# Patient Record
Sex: MALE | Race: WHITE | Employment: FULL TIME | URBAN - METROPOLITAN AREA
[De-identification: names, ages, dates, MRNs, and addresses within clinical notes are randomized per-mention and may not be internally consistent; named-entity substitution may affect disease eponyms.]

---

## 2018-06-05 PROBLEM — J30.2 CHRONIC SEASONAL ALLERGIC RHINITIS: Status: ACTIVE | Noted: 2018-06-05

## 2021-08-09 ENCOUNTER — TELEPHONE (OUTPATIENT)
Dept: FAMILY MEDICINE CLINIC | Age: 31
End: 2021-08-09

## 2021-08-09 NOTE — TELEPHONE ENCOUNTER
----- Message from Louvenia Mcburney, MA sent at 2021 10:27 AM EDT -----  Subject: Appointment Request    Reason for Call: New Patient Request Appointment    QUESTIONS  Type of Appointment? New Patient/New to Provider  Reason for appointment request? No appointments available during search  Additional Information for Provider? Wife has appt in Oct to Est care.  would like appt also. Please call wife to schedule.    [phone]. Needs Thursday appt. early as possible.   ---------------------------------------------------------------------------  --------------  CALL BACK INFO  What is the best way for the office to contact you? OK to leave message on   voicemail  Preferred Call Back Phone Number? [phone]  ---------------------------------------------------------------------------  --------------  SCRIPT ANSWERS  Relationship to Patient? Third Party  Representative Name? Ivy Wiggins  Specialty Confirmation? Primary Care  Is this the first appointment to establish care for a ? No  Have you been diagnosed with, awaiting test results for, or told that you   are suspected of having COVID-19 (Coronavirus)? (If patient has tested   negative or was tested as a requirement for work, school, or travel and   not based on symptoms, answer no)? No  Do you currently have flu-like symptoms including fever or chills, cough,   shortness of breath, difficulty breathing, or new loss of taste or smell? No  Have you had close contact with someone with COVID-19 in the last 14 days? No  (Service Expert - click yes below to proceed with enavu As Usual   Scheduling)?  Yes

## 2021-10-14 ENCOUNTER — OFFICE VISIT (OUTPATIENT)
Dept: FAMILY MEDICINE CLINIC | Age: 31
End: 2021-10-14
Payer: COMMERCIAL

## 2021-10-14 VITALS
OXYGEN SATURATION: 97 % | RESPIRATION RATE: 16 BRPM | BODY MASS INDEX: 31.78 KG/M2 | HEART RATE: 85 BPM | HEIGHT: 71 IN | TEMPERATURE: 97.6 F | SYSTOLIC BLOOD PRESSURE: 126 MMHG | WEIGHT: 227 LBS | DIASTOLIC BLOOD PRESSURE: 84 MMHG

## 2021-10-14 DIAGNOSIS — R53.83 FATIGUE, UNSPECIFIED TYPE: ICD-10-CM

## 2021-10-14 DIAGNOSIS — Z76.89 ENCOUNTER TO ESTABLISH CARE: Primary | ICD-10-CM

## 2021-10-14 DIAGNOSIS — Z83.3 FAMILY HISTORY OF DIABETES MELLITUS: ICD-10-CM

## 2021-10-14 DIAGNOSIS — Z13.220 SCREENING FOR HYPERLIPIDEMIA: ICD-10-CM

## 2021-10-14 DIAGNOSIS — Z80.8 FAMILY HISTORY OF MELANOMA: ICD-10-CM

## 2021-10-14 DIAGNOSIS — Z30.09 VASECTOMY EVALUATION: ICD-10-CM

## 2021-10-14 DIAGNOSIS — D22.9 MULTIPLE ATYPICAL SKIN MOLES: ICD-10-CM

## 2021-10-14 PROCEDURE — 99203 OFFICE O/P NEW LOW 30 MIN: CPT | Performed by: FAMILY MEDICINE

## 2021-10-14 RX ORDER — FAMOTIDINE 20 MG/1
20 TABLET, FILM COATED ORAL 2 TIMES DAILY
COMMUNITY

## 2021-10-14 RX ORDER — M-VIT,TX,IRON,MINS/CALC/FOLIC 27MG-0.4MG
1 TABLET ORAL DAILY
COMMUNITY

## 2021-10-14 ASSESSMENT — ENCOUNTER SYMPTOMS
COUGH: 0
SHORTNESS OF BREATH: 0
SORE THROAT: 0
CONSTIPATION: 0
ABDOMINAL PAIN: 0
RHINORRHEA: 0
VOMITING: 0
SINUS PAIN: 0
DIARRHEA: 0
NAUSEA: 0
BACK PAIN: 0

## 2021-10-14 ASSESSMENT — PATIENT HEALTH QUESTIONNAIRE - PHQ9
SUM OF ALL RESPONSES TO PHQ9 QUESTIONS 1 & 2: 0
2. FEELING DOWN, DEPRESSED OR HOPELESS: 0
SUM OF ALL RESPONSES TO PHQ QUESTIONS 1-9: 0
1. LITTLE INTEREST OR PLEASURE IN DOING THINGS: 0
SUM OF ALL RESPONSES TO PHQ QUESTIONS 1-9: 0
SUM OF ALL RESPONSES TO PHQ QUESTIONS 1-9: 0

## 2021-10-14 NOTE — PROGRESS NOTES
10/14/2021    Chief Complaint   Patient presents with    New Patient     previous pcp Dr Kevin Arora ([facility]) last seen 3 years ago. No specialists. Here to establish care but is concerned with family history of melenoma (father) patient wants some moles checked out.  Health Maintenance     declines flu vaccine       Pierre Ruby (:  1990) is a 27 y.o. male, here for establishing care. They report a PMH of:  Past Medical History:   Diagnosis Date    Allergic rhinitis        Social and family histories reviewed and updated as appropriate. He was previously a patient of Dr. Adalid Ramos Pharmacist  , 2 children  Enjoys hunting, being active    F/U of chronic problem(s) and new or recent complaint of establish care, fatigue   Chronic problems reviewed today include:  allergies  Current status of this/these condition(s):  stable  Tolerating meds: Yes    Current medications include fexofenadine 180 mg daily for chronic allergies and famotidine as needed for alcohol induced facial rash    Family history is notable for melanoma, diabetes    He does report some fatigue symptoms    His wife has some concerns for depression and anxiety - he attributes much of symptoms to work related stress    He is also interested in vasectomy evaluation     Review of Systems   Constitutional: Positive for fatigue. Negative for chills and fever. HENT: Negative for congestion, rhinorrhea, sinus pain and sore throat. Respiratory: Negative for cough and shortness of breath. Cardiovascular: Negative for chest pain, palpitations and leg swelling. Gastrointestinal: Negative for abdominal pain, constipation, diarrhea, nausea and vomiting. Genitourinary: Negative for difficulty urinating. Musculoskeletal: Negative for arthralgias, back pain and gait problem. Skin: Negative for rash. Allergic/Immunologic: Positive for environmental allergies.    Neurological: Negative for dizziness, weakness and numbness. Hematological: Does not bruise/bleed easily. Prior to Visit Medications    Medication Sig Taking? Authorizing Provider   Multiple Vitamins-Minerals (THERAPEUTIC MULTIVITAMIN-MINERALS) tablet Take 1 tablet by mouth daily Yes Historical Provider, MD   famotidine (PEPCID) 20 MG tablet Take 20 mg by mouth 2 times daily Yes Historical Provider, MD   fexofenadine (ALLEGRA ALLERGY) 180 MG tablet Take 180 mg by mouth daily Yes Historical Provider, MD        No Known Allergies    Past Surgical History:   Procedure Laterality Date    CIRCUMCISION      WISDOM TOOTH EXTRACTION         Social History     Socioeconomic History    Marital status:      Spouse name: Not on file    Number of children: Not on file    Years of education: Not on file    Highest education level: Not on file   Occupational History    Not on file   Tobacco Use    Smoking status: Never Smoker    Smokeless tobacco: Never Used   Vaping Use    Vaping Use: Never used   Substance and Sexual Activity    Alcohol use: Yes     Comment: 1-2 drinks a week    Drug use: No    Sexual activity: Yes     Partners: Female   Other Topics Concern    Not on file   Social History Narrative    Not on file     Social Determinants of Health     Financial Resource Strain:     Difficulty of Paying Living Expenses:    Food Insecurity:     Worried About Running Out of Food in the Last Year:     Ran Out of Food in the Last Year:    Transportation Needs:     Lack of Transportation (Medical):      Lack of Transportation (Non-Medical):    Physical Activity:     Days of Exercise per Week:     Minutes of Exercise per Session:    Stress:     Feeling of Stress :    Social Connections:     Frequency of Communication with Friends and Family:     Frequency of Social Gatherings with Friends and Family:     Attends Yazidism Services:     Active Member of Clubs or Organizations:     Attends Club or Organization Meetings:  Marital Status:    Intimate Partner Violence:     Fear of Current or Ex-Partner:     Emotionally Abused:     Physically Abused:     Sexually Abused:         Family History   Problem Relation Age of Onset    Ulcerative Colitis Mother     High Cholesterol Mother     Arrhythmia Father         A fib    Diabetes Father     High Blood Pressure Father     Cancer Father         Melanoma    High Cholesterol Father     Other Sister         SIDS    No Known Problems Daughter        Vitals:    10/14/21 0957   BP: 126/84   Pulse: 85   Resp: 16   Temp: 97.6 °F (36.4 °C)   TempSrc: Temporal   SpO2: 97%   Weight: 227 lb (103 kg)   Height: 5' 11\" (1.803 m)     Estimated body mass index is 31.66 kg/m² as calculated from the following:    Height as of this encounter: 5' 11\" (1.803 m). Weight as of this encounter: 227 lb (103 kg). Physical Exam  Constitutional:       General: He is not in acute distress. Appearance: He is well-developed. HENT:      Head: Normocephalic and atraumatic. Eyes:      Extraocular Movements: Extraocular movements intact. Conjunctiva/sclera: Conjunctivae normal.   Cardiovascular:      Rate and Rhythm: Normal rate and regular rhythm. Pulmonary:      Effort: Pulmonary effort is normal. No respiratory distress. Breath sounds: Normal breath sounds. No wheezing, rhonchi or rales. Abdominal:      General: There is no distension. Musculoskeletal:      Right lower leg: No edema. Left lower leg: No edema. Skin:     Comments: Multiple scattered moles throughout   Neurological:      General: No focal deficit present. Mental Status: He is alert. Mental status is at baseline. PHQ-9 score of 3  MAXINE-7 score of 3    ASSESSMENT/PLAN:  Sharla Giraldo was seen today for new patient and health maintenance.     Diagnoses and all orders for this visit:    Encounter to establish care    Family history of melanoma  -     External Referral To Dermatology    Multiple atypical skin moles  -     External Referral To Dermatology    Family history of diabetes mellitus  -     LIPID PANEL; Future  -     HEMOGLOBIN A1C; Future    Screening for hyperlipidemia  -     LIPID PANEL; Future  -     HEMOGLOBIN A1C; Future    Fatigue, unspecified type  -     CBC; Future  -     Comprehensive Metabolic Panel; Future  -     TSH; Future    Vasectomy evaluation  -     Jud Pierre MD, Urology, [place]([facility])      Additional plan and future considerations:   As above. EMR reviewed. Refer to Derm. Refer to Urology. Discussed counseling and/or medication treatment for mild anxiety symptoms, will monitor for now. Will notify lab results via [address] RTO pending labs results and/or interest in medication treatment for anxiety     Educational materials and/or home exercises printed for patient's review and were included in patient instructions on his/her After Visit Summary and given to patient at the end of visit.           --Hailey Burris DO on 10/14/2021 at 10:15 AM

## 2021-10-14 NOTE — PATIENT INSTRUCTIONS
Patient Education        Fatigue: Care Instructions  Your Care Instructions     Fatigue is a feeling of tiredness, exhaustion, or lack of energy. You may feel fatigue because of too much or not enough activity. It can also come from stress, lack of sleep, boredom, and poor diet. Many medical problems, such as viral infections, can cause fatigue. Emotional problems, especially depression, are often the cause of fatigue. Fatigue is most often a symptom of another problem. Treatment for fatigue depends on the cause. For example, if you have fatigue because you have a certain health problem, treating this problem also treats your fatigue. If depression or anxiety is the cause, treatment may help. Follow-up care is a key part of your treatment and safety. Be sure to make and go to all appointments, and call your doctor if you are having problems. It's also a good idea to know your test results and keep a list of the medicines you take. How can you care for yourself at home? · Get regular exercise. But don't overdo it. Go back and forth between rest and exercise. · Get plenty of rest.  · Eat a healthy diet. Do not skip meals, especially breakfast.  · Reduce your use of caffeine, tobacco, and alcohol. Caffeine is most often found in coffee, tea, cola drinks, and chocolate. · Limit medicines that can cause fatigue. This includes tranquilizers and cold and allergy medicines. When should you call for help? Watch closely for changes in your health, and be sure to contact your doctor if:    · You have new symptoms such as fever or a rash.     · Your fatigue gets worse.     · You have been feeling down, depressed, or hopeless. Or you may have lost interest in things that you usually enjoy.     · You are not getting better as expected. Where can you learn more? Go to [email or web address] org and sign in to your [employer] account.  Enter U341 in the [employer] box to learn more about \"Fatigue: Care Instructions. \"     If you do not have an account, please click on the \"Sign Up Now\" link. Current as of: July 1, 2021               Content Version: 13.0  © 3539-2634 Healthwise, Incorporated. Care instructions adapted under license by [facility] [place] ([facility]). If you have questions about a medical condition or this instruction, always ask your healthcare professional. [address] any warranty or liability for your use of this information.

## 2022-01-10 ENCOUNTER — OFFICE VISIT (OUTPATIENT)
Dept: FAMILY MEDICINE CLINIC | Age: 32
End: 2022-01-10
Payer: COMMERCIAL

## 2022-01-10 VITALS
DIASTOLIC BLOOD PRESSURE: 98 MMHG | HEIGHT: 71 IN | HEART RATE: 115 BPM | SYSTOLIC BLOOD PRESSURE: 142 MMHG | RESPIRATION RATE: 17 BRPM | OXYGEN SATURATION: 98 % | BODY MASS INDEX: 31.78 KG/M2 | TEMPERATURE: 97.4 F | WEIGHT: 227 LBS

## 2022-01-10 DIAGNOSIS — U07.1 COVID-19: ICD-10-CM

## 2022-01-10 DIAGNOSIS — Z20.822 CLOSE EXPOSURE TO COVID-19 VIRUS: ICD-10-CM

## 2022-01-10 DIAGNOSIS — R06.00 DYSPNEA, UNSPECIFIED TYPE: Primary | ICD-10-CM

## 2022-01-10 LAB
Lab: ABNORMAL
PERFORMING INSTRUMENT: ABNORMAL
QC PASS/FAIL: ABNORMAL
SARS-COV-2, POC: DETECTED

## 2022-01-10 PROCEDURE — 87426 SARSCOV CORONAVIRUS AG IA: CPT | Performed by: NURSE PRACTITIONER

## 2022-01-10 PROCEDURE — 99213 OFFICE O/P EST LOW 20 MIN: CPT | Performed by: NURSE PRACTITIONER

## 2022-01-10 RX ORDER — AZITHROMYCIN 250 MG/1
250 TABLET, FILM COATED ORAL SEE ADMIN INSTRUCTIONS
Qty: 6 TABLET | Refills: 0 | Status: SHIPPED | OUTPATIENT
Start: 2022-01-10 | End: 2022-01-15

## 2022-01-10 RX ORDER — ALBUTEROL SULFATE 90 UG/1
2 AEROSOL, METERED RESPIRATORY (INHALATION) EVERY 6 HOURS PRN
Qty: 18 G | Refills: 1 | Status: SHIPPED
Start: 2022-01-10 | End: 2022-05-12

## 2022-01-10 RX ORDER — DEXAMETHASONE 6 MG/1
6 TABLET ORAL 2 TIMES DAILY WITH MEALS
Qty: 10 TABLET | Refills: 0 | Status: SHIPPED | OUTPATIENT
Start: 2022-01-10 | End: 2022-01-15

## 2022-01-10 SDOH — ECONOMIC STABILITY: FOOD INSECURITY: WITHIN THE PAST 12 MONTHS, YOU WORRIED THAT YOUR FOOD WOULD RUN OUT BEFORE YOU GOT MONEY TO BUY MORE.: NEVER TRUE

## 2022-01-10 SDOH — ECONOMIC STABILITY: FOOD INSECURITY: WITHIN THE PAST 12 MONTHS, THE FOOD YOU BOUGHT JUST DIDN'T LAST AND YOU DIDN'T HAVE MONEY TO GET MORE.: NEVER TRUE

## 2022-01-10 ASSESSMENT — SOCIAL DETERMINANTS OF HEALTH (SDOH): HOW HARD IS IT FOR YOU TO PAY FOR THE VERY BASICS LIKE FOOD, HOUSING, MEDICAL CARE, AND HEATING?: NOT HARD AT ALL

## 2022-01-10 NOTE — PROGRESS NOTES
Chief Complaint       Sinus Problem (sinus congestion and drainage, body aches, shortness of breath  started yesterday )    History of Present Illness   Source of history provided by:  patient. Nadya Bro is a 27 y.o. old male presenting to the walk in clinic for evaluation of above symptoms, for x 2 days. Denies any diarrhea, nausea CP, dyspnea, LE edema, abdominal pain, vomiting, rash, or lethargy. Denies hx of asthma or COPD; denies tobacco use. Patient reports recent sick exposures. Patient has  been vaccinated for COVID-19. Patient has been taking OTC for symptomatic relief. Works [employer]. Able to eat & drink. ROS    Unless otherwise stated in this report or unable to obtain because of the patient's clinical or mental status as evidenced by the medical record, this patients's positive and negative responses for Review of Systems, constitutional, psych, eyes, ENT, cardiovascular, respiratory, gastrointestinal, neurological, genitourinary, musculoskeletal, integument systems and systems related to the presenting problem are either stated in the preceding or were not pertinent or were negative for the symptoms and/or complaints related to the medical problem. Past Medical History:  has a past medical history of Allergic rhinitis. Past Surgical History:  has a past surgical history that includes [place] tooth extraction and Circumcision. Social History:  reports that he has never smoked. He has never used smokeless tobacco. He reports current alcohol use. He reports that he does not use drugs. Family History: family history includes Arrhythmia in his father; Cancer in his father; Diabetes in his father; High Blood Pressure in his father; High Cholesterol in his father and mother; No Known Problems in his daughter; Other in his sister; Ulcerative Colitis in his mother. Allergies: Patient has no known allergies.     Physical Exam         VS:  BP (!) 142/98   Pulse 115   Temp 97.4 °F (36.3 °C) (Temporal)   Resp 17   Ht 5' 11\" (1.803 m)   Wt 227 lb (103 kg)   SpO2 98%   BMI 31.66 kg/m²    Oxygen Saturation Interpretation: Normal.    Constitutional:  Alert, development consistent with age. NAD. Head:  NC/NT. Airway patent. Cerumen noted. Mouth: Posterior pharynx with mild erythema and clear postnasal drip. No tonsillar hypertrophy or exudate. Neck:  Normal ROM. Supple. No anterior cervical adenopathy noted. Lungs: CTAB without wheezes, rales, or rhonchi. CV:  Regular rate and rhythm, normal heart sounds, without pathological murmurs, ectopy, gallops, or rubs. Skin:  Normal turgor. Warm, dry, without visible rash. Lymphatic: No lymphangitis or adenopathy noted. Neurological:  Oriented. Motor functions intact. Lab / Imaging Results   (All laboratory and radiology results have been personally reviewed by myself)  Labs:  Results for orders placed or performed in visit on 01/10/22   POCT COVID-19, Antigen   Result Value Ref Range    SARS-COV-2, POC Detected (A) Not Detected    Lot Number [id number]     QC Pass/Fail pass     Performing Instrument BD Veritor        Imaging: All Radiology results interpreted by Radiologist unless otherwise noted. Results for orders placed or performed in visit on 01/10/22   POCT COVID-19, Antigen   Result Value Ref Range    SARS-COV-2, POC Detected (A) Not Detected    Lot Number [id number]     QC Pass/Fail pass     Performing Instrument BD Veritor      Assessment / Plan     Impression(s):  Franklyn Nelson was seen today for sinus problem.     Diagnoses and all orders for this visit:    Dyspnea, unspecified type  -     POCT COVID-19, Antigen  -     COVID-19 Ambulatory    Close exposure to COVID-19 virus  -     POCT COVID-19, Antigen  -     COVID-19 Ambulatory        - Red Flag items & conservative methods discussed including OTC methods & Coricidin medication  - F/u with PCP if symptoms persist  - Work/school letter provided in AVS if requested    Disposition:  Disposition: Discharge to home. Advised cautionary self-quarantine at home in the interim. Increase fluids and rest. Symptomatic relief discussed including Tylenol prn pain/fever. Schedule virtual f/u with PCP in 7-10 days if symptoms persist. ED sooner if symptoms worsen or change. ED immediately with high or refractory fever, progressive SOB, dyspnea, CP, calf pain/swelling, shaking chills, vomiting, abdominal pain, lethargy, flank pain, or decreased urinary output. Pt verbalizes understanding and is in agreement with plan of care. All questions answered. Evan Foote, APRN - CNP    **This report was transcribed using voice recognition software. Every effort was made to ensure accuracy; however, inadvertent computerized transcription errors may be present.

## 2022-01-10 NOTE — PATIENT INSTRUCTIONS
[address], [facility] [place][note 2][address] [place] [address]  Phone: [phone]  Fax: [phone]    TERESA Chamorro CNP      1/10/2022     Patient: Diann Harman   YOB: 1990       To Whom It May Concern: It is my medical opinion that Diann Harman should remain out of work while acutely ill or awaiting COVID-19 test results. Patient tested positive in our office today. Return to work with no retesting should be followed if test is negative AND meets any/all these criteria as outlined by CDC/ODH:   a. No fever without the use of fever reducers for 24 hours  b. Improvement in symptoms  c. At least 5 days since the onset of symptoms     If tests positive for COVID-19, needs minimum of 5 days strict quarantine based upon CDC guidelines, improvement of symptoms and 24 hours fever free without fever reducing medications. There is no need to retest following initial diagnosis for a return to work. Pt has been instructed to follow up with their PCP if symptoms persist.    If you have any questions or concerns, please don't hesitate to call.     Sincerely,        TERESA Chamorro CNP

## 2022-01-12 LAB
SARS-COV-2: DETECTED
SOURCE: ABNORMAL

## 2022-04-18 ENCOUNTER — TELEPHONE (OUTPATIENT)
Dept: FAMILY MEDICINE CLINIC | Age: 32
End: 2022-04-18

## 2022-04-18 NOTE — TELEPHONE ENCOUNTER
He would be able to be seen in [place] most likely this week, Dr. Geoffrey Garcia is new and does not have a full schedule, phone number here in [facility] is [phone]

## 2022-04-18 NOTE — TELEPHONE ENCOUNTER
Call transferred from nurse triage. Pt's wife asking to schedule an appt--she's concerned pt may be developing A-fib; said pt's father was dx at about the same age. Wife says pt's had increased heart rate off and on for at least a month--said it's happened 3 times. Advised wife to take pt to ER but she said pt refuses. Said she just wants to schedule an appt. Suggested to call Dr Sabrina Vasquez new office since pt already established and she refused. Advised wife I'd check the providers schedules and I advised the soonest with Danika was 5/18/22. Before I could check any other schedules, wife said she'd have to check pt's schedule and call back.

## 2022-07-21 ENCOUNTER — HOSPITAL ENCOUNTER (OUTPATIENT)
Dept: NON INVASIVE DIAGNOSTICS | Age: 32
Discharge: HOME | End: 2022-07-21
Payer: COMMERCIAL

## 2022-07-21 LAB
LV EF: 78 %
LVEF MODALITY: NORMAL

## 2022-07-21 PROCEDURE — 93306 TTE W/DOPPLER COMPLETE: CPT

## 2025-08-25 LAB — VZV IGG SER QL IA: NORMAL
